# Patient Record
Sex: FEMALE | Race: OTHER | NOT HISPANIC OR LATINO
[De-identification: names, ages, dates, MRNs, and addresses within clinical notes are randomized per-mention and may not be internally consistent; named-entity substitution may affect disease eponyms.]

---

## 2023-03-05 PROBLEM — Z00.00 ENCOUNTER FOR PREVENTIVE HEALTH EXAMINATION: Status: ACTIVE | Noted: 2023-03-05

## 2023-03-07 ENCOUNTER — APPOINTMENT (OUTPATIENT)
Dept: OBGYN | Facility: CLINIC | Age: 33
End: 2023-03-07
Payer: COMMERCIAL

## 2023-03-07 VITALS — HEIGHT: 63 IN | WEIGHT: 170 LBS | TEMPERATURE: 98 F | BODY MASS INDEX: 30.12 KG/M2

## 2023-03-07 VITALS — DIASTOLIC BLOOD PRESSURE: 70 MMHG | SYSTOLIC BLOOD PRESSURE: 112 MMHG

## 2023-03-07 DIAGNOSIS — Z78.9 OTHER SPECIFIED HEALTH STATUS: ICD-10-CM

## 2023-03-07 PROCEDURE — 76817 TRANSVAGINAL US OBSTETRIC: CPT

## 2023-03-07 PROCEDURE — 99203 OFFICE O/P NEW LOW 30 MIN: CPT

## 2023-03-07 RX ORDER — OXYCODONE 5 MG/1
5 TABLET ORAL
Qty: 6 | Refills: 0 | Status: ACTIVE | COMMUNITY
Start: 2023-03-07 | End: 1900-01-01

## 2023-03-14 ENCOUNTER — APPOINTMENT (OUTPATIENT)
Dept: OBGYN | Facility: CLINIC | Age: 33
End: 2023-03-14
Payer: COMMERCIAL

## 2023-03-14 VITALS — TEMPERATURE: 97.8 F | HEIGHT: 63 IN | BODY MASS INDEX: 30.12 KG/M2 | WEIGHT: 170 LBS

## 2023-03-14 DIAGNOSIS — N91.1 SECONDARY AMENORRHEA: ICD-10-CM

## 2023-03-14 DIAGNOSIS — Z33.2 ENCOUNTER FOR ELECTIVE TERMINATION OF PREGNANCY: ICD-10-CM

## 2023-03-14 PROCEDURE — 99212 OFFICE O/P EST SF 10 MIN: CPT

## 2023-03-14 PROCEDURE — 76830 TRANSVAGINAL US NON-OB: CPT

## 2023-03-14 NOTE — PHYSICAL EXAM
[Appropriately responsive] : appropriately responsive [Alert] : alert [No Acute Distress] : no acute distress [Oriented x3] : oriented x3 [Labia Majora] : normal [Labia Minora] : normal [Scant] : There was scant vaginal bleeding [Normal] : normal [Anteversion] : anteverted [Uterine Adnexae] : normal [Tenderness] : nontender [Enlarged ___ wks] : not enlarged [Mass ___ cm] : no uterine mass was palpated

## 2023-03-14 NOTE — HISTORY OF PRESENT ILLNESS
[FreeTextEntry1] : 31 yo  LMP last week of 2023 presents for followup for termination of pregnancy. Since last visit she had 1 day of strong cramping and bleeding, since then has very light bleeding. Pain completely controlled today, no other complaints.\par \par OB:  vaginal delivery x3, uncomplicated all at [facility]\par GYN: Denies h/o fibroids, cysts, abnormal paps, or STIs. Last pap prior to COVID 19 pandemic, likely 2019. 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 \par

## 2023-03-14 NOTE — DISCUSSION/SUMMARY
[FreeTextEntry1] : 31 yo  LMP last week of 2023 with complete medical termination of pregnancy\par - pt's historic Rh is positive\par - Discussed completion seen on ultrasound, can have continued bleeding for several weeks, should be lighter over time, typically resolves within 2 weeks\par - DIscussed contraception options including pills, patches, nuvaring, depo, nexplanon, IUDs, tubal sterilization. Pt will think about it but her  will likely get vasectomy. Will call if she wants to start anything\par - Return for annual visit or PRN